# Patient Record
(demographics unavailable — no encounter records)

---

## 2024-10-23 NOTE — REASON FOR VISIT
[Initial Evaluation] : an initial evaluation [Parents] : parents [FreeTextEntry1] : right forearm fx

## 2024-10-23 NOTE — DATA REVIEWED
[de-identified] : 2 views of the right forearm in Cast reveals minimally angulated midshaft radius and ulna fracture in acceptable position.

## 2024-10-23 NOTE — DATA REVIEWED
[de-identified] : 2 views of the right forearm in Cast reveals minimally angulated midshaft radius and ulna fracture in acceptable position.

## 2024-10-23 NOTE — HISTORY OF PRESENT ILLNESS
[Stable] : stable [FreeTextEntry1] : 3 yo male presents with parents for evaluation of right forearm fracture. Parents state he fell off of a chair while climbing it injuring the right arm. He was seen the next day at [facility] where xrays were taken and he was placed in a LAC. He has been doing well in the cast. Some discomfort when trying to got to sleep but  he remains active. No fever or chills. No meds used. DOI 10/15/24.

## 2024-10-23 NOTE — CONSULT LETTER
[Dear  ___] : Dear  [unfilled], [Consult Letter:] : I had the pleasure of evaluating your patient, [unfilled]. [Please see my note below.] : Please see my note below. [Consult Closing:] : Thank you very much for allowing me to participate in the care of this patient.  If you have any questions, please do not hesitate to contact me. [Sincerely,] : Sincerely, [FreeTextEntry3] : Nomi Garcia MD Division of Pediatric Orthopaedics [place] [address] [phone] fax: [phone]

## 2024-10-23 NOTE — HISTORY OF PRESENT ILLNESS
[Stable] : stable [FreeTextEntry1] : 1 yo male presents with parents for evaluation of right forearm fracture. Parents state he fell off of a chair while climbing it injuring the right arm. He was seen the next day at [facility] where xrays were taken and he was placed in a LAC. He has been doing well in the cast. Some discomfort when trying to got to sleep but  he remains active. No fever or chills. No meds used. DOI 10/15/24.

## 2024-10-23 NOTE — ASSESSMENT
[FreeTextEntry1] : Forearm fracture right  The history for today's visit was obtained from the  parent due to age and therefore, the parent was used today as an independent historian. 2 views of the right forearm in Cast reveals minimally angulated midshaft radius and ulna fracture in acceptable position.  The xrays and clinical exam discussed with parents. He will continue the LAC for an additional 2 weeks He will f/u in 2 weeks for cast removal and xrays out of the cast. It was discussed he will be fearful to move the arm which is expected after immobilzation. No playgrounds, bouncy houses trampolines for 1-2 weeks after cast removal Remodeling of fractures in children also discussed  All questions answered. Parent in agreement with the plan. IKatie, MPAS, PAC, have acted as a scribe and documented the above for Dr. Garcia.

## 2024-10-23 NOTE — PHYSICAL EXAM
[FreeTextEntry1] : GAIT: No limp. Good coordination and balance noted. GENERAL: alert, cooperative pleasant young 3 yo male in NAD SKIN: The skin is intact, warm, pink and dry over the area examined. EYES: Normal conjunctiva, normal eyelids and pupils were equal and round. ENT: normal ears, normal nose and normal lips. CARDIOVASCULAR: brisk capillary refill, but no peripheral edema. RESPIRATORY: The patient is in no apparent respiratory distress. They're taking full deep breaths without use of accessory muscles or evidence of audible wheezes or stridor without the use of a stethoscope. Normal respiratory effort. ABDOMEN: not examined   RUE: Cast is present and well fitting Skin is intact to the areas exposed. fingers mobile sensation grossly intact no pain with passive stretch of the digits. brisk cap refill

## 2024-10-23 NOTE — CONSULT LETTER
[Dear  ___] : Dear  [unfilled], [Consult Letter:] : I had the pleasure of evaluating your patient, [unfilled]. [Please see my note below.] : Please see my note below. [Consult Closing:] : Thank you very much for allowing me to participate in the care of this patient.  If you have any questions, please do not hesitate to contact me. [Sincerely,] : Sincerely, [FreeTextEntry3] : Nomi Garcia MD Division of Pediatric [facility]ed[place] [address] [phone] fax: [phone]

## 2024-10-23 NOTE — REVIEW OF SYSTEMS
[Change in Activity] : change in activity [Appropriate Age Development] : development appropriate for age [Rash] : no rash [Heart Problems] : no heart problems [Congestion] : no congestion [Feeding Problem] : no feeding problem

## 2024-10-23 NOTE — PHYSICAL EXAM
[FreeTextEntry1] : GAIT: No limp. Good coordination and balance noted. GENERAL: alert, cooperative pleasant young 1 yo male in NAD SKIN: The skin is intact, warm, pink and dry over the area examined. EYES: Normal conjunctiva, normal eyelids and pupils were equal and round. ENT: normal ears, normal nose and normal lips. CARDIOVASCULAR: brisk capillary refill, but no peripheral edema. RESPIRATORY: The patient is in no apparent respiratory distress. They're taking full deep breaths without use of accessory muscles or evidence of audible wheezes or stridor without the use of a stethoscope. Normal respiratory effort. ABDOMEN: not examined   RUE: Cast is present and well fitting Skin is intact to the areas exposed. fingers mobile sensation grossly intact no pain with passive stretch of the digits. brisk cap refill

## 2024-11-06 NOTE — HISTORY OF PRESENT ILLNESS
[0] : currently ~his/her~ pain is 0 out of 10 [FreeTextEntry1] : 3 yo male presents with parents for  f/u evaluation of right forearm fracture. Parents state he fell off of a chair while climbing it injuring the right arm. He was seen the next day at [facility] where xrays were taken and he was placed in a LAC. He has been doing well in the cast. No pain reported or cast issues.  he remains active. No fever or chills. No meds used. DOI 10/15/24. He is here for cast removal and xrays.

## 2024-11-06 NOTE — ASSESSMENT
[FreeTextEntry1] : Forearm fracture right  The history for today's visit was obtained from the  parent due to age and therefore, the parent was used today as an independent historian.  2 views of the right forearm out of the Cast reveals minimally angulated midshaft radius and ulna fracture in acceptable position with bridging callus. Fx line still visible but bridging callus surrounding. The xrays and clinical exam discussed with parents. No further immobilization is needed at this time. He will avoid playground, trampoline, bounce house for an additional week. Remodeling of fractures in children also discussed. He will f/u with us on a PRN basis. All questions answered. Parent in agreement with the plan.  IKatie, MPAS, PAC, have acted as a scribe and documented the above for Dr. Garcia.   The above documentation completed by the PA is an accurate record of both my words and actions. Nomi Garcia MD.

## 2024-11-06 NOTE — PHYSICAL EXAM
[FreeTextEntry1] : GAIT: No limp. Good coordination and balance noted. GENERAL: alert, cooperative pleasant young 1 yo male in NAD SKIN: The skin is intact, warm, pink and dry over the area examined. EYES: Normal conjunctiva, normal eyelids and pupils were equal and round. ENT: normal ears, normal nose and normal lips. CARDIOVASCULAR: brisk capillary refill, but no peripheral edema. RESPIRATORY: The patient is in no apparent respiratory distress. They're taking full deep breaths without use of accessory muscles or evidence of audible wheezes or stridor without the use of a stethoscope. Normal respiratory effort. ABDOMEN: not examined   RUE: Cast is present and well fitting. Removed today SKin intact. No clinical deformity noted.  No tenderness over the fracture site.  Full elbow ROM including pronation and supination. Full wrist ROM.. fingers mobile sensation grossly intact brisk cap refill

## 2024-11-06 NOTE — DATA REVIEWED
[de-identified] : 2 views of the right forearm out of the Cast reveals minimally angulated midshaft radius and ulna fracture in acceptable position with bridging callus. Fx line still visible but bridging callus surrounding.